# Patient Record
Sex: MALE | Race: WHITE
[De-identification: names, ages, dates, MRNs, and addresses within clinical notes are randomized per-mention and may not be internally consistent; named-entity substitution may affect disease eponyms.]

---

## 2020-01-25 NOTE — EDM.PDOC
ED HPI GENERAL MEDICAL PROBLEM





- General


Stated Complaint: NUMBNESS ON LT SIDE OF FACE AND HAND


Time Seen by Provider: 01/25/20 15:26


Source of Information: Reports: Patient


History Limitations: Reports: No Limitations





- History of Present Illness


INITIAL COMMENTS - FREE TEXT/NARRATIVE: 





79-year-old male who reports an approximate 6 PM last night he noticed some 

mild numbness along the left side of his face/cheek and his left hand. He 

reports that the numbness has been present at least to some degree since 6 PM 

last night. It does appear to be waxing and waning and at times she feels that 

it almost completely resolved but does not completely resolve. He has had no 

weakness. No balance issues. No vision problems. No headache. No problems 

speaking. No problems thinking. No problems swallowing. He reports that he has 

been eating and drinking normally. No trauma. He reports that he has been 

taking his medicines as directed daily. He denies any pain at present. He rates 

his pain as a 0/10. There are no other associated signs or symptoms. There are 

no other modifying factors. He does report that he was diagnosed with a TIA 1-1/

2 years ago at [facility] and he reports that he had rarely Which 

included scans of his head (MRI and CTA) and this showed really nothing except 

a small area in his left posterior brain that he did not feel was the cause of 

any of his problems or needed any acute intervention.


Onset: Other (6 PM last night)


Duration: Waxing/Waning (But continually present to some degree since 6 PM last 

night)


Location: Reports: Face, Upper Extremity, Left (Left hand)


Quality: Reports: Other (No pain. Just numbness.)


Severity: Mild


Improves with: Reports: None


Worsens with: Reports: None


Context: Reports: Other (As above)


Associated Symptoms: Reports: No Other Symptoms


Treatments PTA: Reports: Other (see below) (Nothing. He has taken his regular 

medications.)





- Related Data


 Allergies











Allergy/AdvReac Type Severity Reaction Status Date / Time


 


Sulfa (Sulfonamide Allergy  Cannot Verified 01/25/20 15:41





Antibiotics)   Remember  











Home Meds: 


 Home Meds





Lisinopril [Zestril] 10 mg PO DAILY 01/25/20 [History]


Simvastatin 40 mg PO DAILY 01/25/20 [History]


glyBURIDE [Glyburide] 5 mg PO DAILY 01/25/20 [History]


metFORMIN [Glucophage] 500 mg PO BID 01/25/20 [History]











Past Medical History


Cardiovascular History: Reports: High Cholesterol, Hypertension


Neurological History: Reports: TIA


Endocrine/Metabolic History: Reports: Diabetes, Type II





- Past Surgical History


GI Surgical History: Reports: Appendectomy


Dermatological Surgical History: Reports: Other (See Below) (Pilonidal 

cystectomy)





Social & Family History





- Tobacco Use


Smoking Status *Q: Former Smoker (With 35 years ago.)





- Alcohol Use


Alcohol Use History: Yes


Alcohol Use Frequency: Daily (Drinks 1 to 2 beers daily)





- Living Situation & Occupation


Occupation: Retired


Social History Comment: He presents to the emergency department by himself.





ED ROS GENERAL





- Review of Systems


Review Of Systems: See Below


Constitutional: Reports: No Symptoms


HEENT: Reports: No Symptoms


Respiratory: Reports: No Symptoms


Cardiovascular: Reports: No Symptoms


GI/Abdominal: Reports: No Symptoms


: Reports: No Symptoms


Musculoskeletal: Reports: No Symptoms


Skin: Reports: No Symptoms


Neurological: Reports: Numbness (Over left face and left hand)


Hematologic/Lymphatic: Reports: No Symptoms


Immunologic: Reports: No Symptoms





ED EXAM, NEURO





- Physical Exam


Exam: See Below


Exam Limited By: No Limitations


General Appearance: Alert, WD/WN, No Apparent Distress


Eye Exam: Bilateral Eye: EOMI, Normal Inspection, PERRL


Ears: Normal External Exam, Hearing Loss


Nose: Normal Inspection, Normal Mucosa, No Blood


Throat/Mouth: Normal Inspection, Normal Lips, Normal Oropharynx, Normal Voice, 

No Airway Compromise


Head Exam: Atraumatic, Normocephalic


Neck: Normal Inspection, Supple, Non-Tender, Full Range of Motion


Respiratory/Chest: No Respiratory Distress, Lungs Clear, Normal Breath Sounds, 

No Accessory Muscle Use, Chest Non-Tender


Cardiovascular: Normal Peripheral Pulses, Regular Rate, Rhythm, No JVD, No 

Murmur


GI/Abdominal: Normal Bowel Sounds, Soft, Non-Tender, No Organomegaly, No Mass


Neurological: Alert, Normal Mood/Affect, Normal Dorsiflexion, CN II-XII Intact, 

Normal Plantar Flexion, No Motor/Sensory Deficits, Oriented x 3


Back Exam: Normal Inspection, Full Range of Motion


Extremities: Normal Inspection, Normal Range of Motion, Non-Tender, No Pedal 

Edema, Normal Capillary Refill


Psychiatric: Normal Affect


Skin Exam: Warm, Dry, Intact, Normal Color, No Rash





EKG INTERPRETATION


EKG Date: 01/25/20


Time: 16:18


Rhythm: NSR


Rate (Beats/Min): 89


Axis: LAD-Left Axis Deviation (Mild)


P-Wave: Present


QRS: Normal


ST-T: Normal


QT: Prolonged (QTc)


DE/PQ Interval: Prolonged DE interval


Comparison: NA - No Prior EKG





Course





- Vital Signs


Last Recorded V/S: 


 Last Vital Signs











Temp  37.0 C   01/25/20 15:49


 


Pulse  92   01/25/20 15:49


 


Resp  15   01/25/20 15:49


 


BP  149/90 H  01/25/20 17:20


 


Pulse Ox  99   01/25/20 15:49














- Orders/Labs/Meds


Orders: 


 Active Orders 24 hr











 Category Date Time Status


 


 EKG Documentation Completion [RC] ASDIRECTED Care  01/25/20 15:46 Active


 


 Ang Head [CT] Stat Exams  01/25/20 17:23 Taken


 


 Ang Neck [CT] Stat Exams  01/25/20 17:23 Taken


 


 Head wo Cont [CT] Stat Exams  01/25/20 15:45 Taken


 


 Sodium Chloride 0.9% [Saline Flush] Med  01/25/20 15:45 Active





 10 ml FLUSH ASDIRECTED PRN   


 


 Peripheral IV Insertion Adult [OM.PC] Routine Oth  01/25/20 15:45 Ordered


 


 EKG 12 Lead [EK] Routine Ther  01/25/20 15:45 Ordered








 Medication Orders





Sodium Chloride (Saline Flush)  10 ml FLUSH ASDIRECTED PRN


   PRN Reason: Keep Vein Open








Labs: 


 Laboratory Tests











  01/25/20 01/25/20 Range/Units





  16:08 16:08 


 


WBC  9.3   (4.5-12.0)  X10-3/uL


 


RBC  5.16   (4.30-5.75)  x10(6)uL


 


Hgb  14.9   (13.5-17.8)  g/dL


 


Hct  44.7   (30.0-51.3)  %


 


MCV  86.5   (80-96)  fL


 


MCH  28.9   (27.7-33.6)  pg


 


MCHC  33.4   (32.2-35.4)  g/dL


 


RDW  13.0   (11.5-15.5)  %


 


Plt Count  285   (125-369)  X10(3)uL


 


MPV  7.9   (7.4-10.4)  fL


 


Neut % (Auto)  61.9   (46-82)  %


 


Lymph % (Auto)  27.3   (13-37)  %


 


Mono % (Auto)  6.9   (4-12)  %


 


Eos % (Auto)  3   (1.0-5.0)  %


 


Baso % (Auto)  1   (0-2)  %


 


Neut # (Auto)  5.8   (1.6-8.3)  #


 


Lymph # (Auto)  2.5   (0.6-5.0)  #


 


Mono # (Auto)  0.6   (0.0-1.3)  #


 


Eos # (Auto)  0.3   (0.0-0.8)  #


 


Baso # (Auto)  0.1   (0.0-0.2)  #


 


ESR  6   (0-15)  mm/hr


 


Sodium   139  (135-145)  mmol/L


 


Potassium   4.2  (3.5-5.3)  mmol/L


 


Chloride   102  (100-110)  mmol/L


 


Carbon Dioxide   26  (21-32)  mmol/L


 


BUN   18  (7-18)  mg/dL


 


Creatinine   1.0  (0.70-1.30)  mg/dL


 


Est Cr Clr Drug Dosing   TNP  


 


Estimated GFR (MDRD)   > 60  (>60)  


 


BUN/Creatinine Ratio   18.0  (9-20)  


 


Glucose   148 H  ()  mg/dL


 


Calcium   9.5  (8.6-10.2)  mg/dL


 


Magnesium   1.9  (1.8-2.5)  mg/dL


 


Total Bilirubin   0.3  (0.1-1.3)  mg/dL


 


AST   15  (5-25)  IU/L


 


ALT   21  (12-36)  U/L


 


Alkaline Phosphatase   93  ()  IU/L


 


Total Protein   7.5  (6.0-8.0)  g/dL


 


Albumin   3.8  (3.2-4.6)  g/dL


 


Globulin   3.7  g/dL


 


Albumin/Globulin Ratio   1.0  











Meds: 


Medications











Generic Name Dose Route Start Last Admin





  Trade Name Freq  PRN Reason Stop Dose Admin


 


Sodium Chloride  10 ml  01/25/20 15:45  





  Saline Flush  FLUSH   





  ASDIRECTED PRN   





  Keep Vein Open   





     





     





     














Discontinued Medications














Generic Name Dose Route Start Last Admin





  Trade Name Freq  PRN Reason Stop Dose Admin


 


Atorvastatin Calcium  40 mg  01/25/20 17:29  01/25/20 18:21





  Lipitor  PO  01/25/20 17:30  40 mg





  ONETIME ONE   Administration





     





     





     





     


 


Clopidogrel Bisulfate  300 mg  01/25/20 17:25  01/25/20 17:49





  Plavix  PO  01/25/20 17:26  300 mg





  ONETIME ONE   Administration





     





     





     





     


 


Sodium Chloride  500 mls @ 999 mls/hr  01/25/20 17:23  01/25/20 17:59





  Normal Saline  IV  01/25/20 17:53  999 mls/hr





  .BOLUS ONE   Administration





     





     





     





     


 


Iopamidol  100 ml  01/25/20 17:34  01/25/20 17:45





  Isovue-370 (76%)  IV  01/25/20 17:35  100 ml





  .AS DIRECTED ONE   Administration





     





     





     





     














- Radiology Interpretation


Free Text/Narrative:: 





CT scan of head showed no acute abnormality per the [facility] radiologist in 

[place].





CTA of the head shows no high-grade stenosis or occlusion per the radiologist.





CTA of the neck shows occlusion of the left vertebral artery about 2 m distal 

to the bifurcation with reconstitution and multifocal on the less than 50%, 

narrowing proximal to the mid left vertebral artery the radiologist.








- Re-Assessments/Exams


Free Text/Narrative Re-Assessment/Exam: 





01/25/20 17:08: Patient has remained neurologically stable. His blood pressure 

is still in the 180/100 range. He has no headache. He still has some mild 

numbness along his left face and left hand. His CT is normal. His blood tests 

are reassuringly normal. The EKG showed no acute problems. I will will call and 

discuss his case with the stroke neurologist at [facility] in [place].





01/25/20 17:25: I discussed the patient's case with Dr. Vasquez, stroke 

neurologist at [facility] in [place], and he feels that patient should be admitted. 

He feels the patient would be able to be admitted at our facility. He did 

recommend getting a CTA of the patient's head and neck and he would only 

recommend transfer to a higher level of care if the patient had significant 

stenosis in his carotids or the patient had some deterioration. He did not 

recommend treating the patient's blood pressure the blood pressure mynor greater 

than 220/120. He did recommend change in the patient the Plavix and he would 

give the patient a 300 mg loading dose initially. He also recommended the 

patient to Lipitor 40 mg daily the patient did not have any contraindication to 

this. I discussed this with the patient and he is in agreement with this plan. 

His repeat blood pressure was 140/90.





01/25/20 19:50: CTA of the head and neck did show some occlusion in the left 

vertebral artery cussed the case with Dr. Vasquez, stroke neurologist at 

[facility], and he felt that there was no acute intervention that needed 

to be made of this and the patient could still be admitted at our facility with 

no need for transfer at this time. I discussed this with the patient and he is 

in agreement with the plans for admission here at [facility].








Departure





- Departure


Time of Disposition: 19:55


Disposition: Refer to Observation


Condition: Good (Stable)


Clinical Impression: 


 Acute CVA (cerebrovascular accident)





Hypertension


Qualifiers:


 Hypertension type: essential hypertension Qualified Code(s): I10 - Essential (

primary) hypertension








- Discharge Information


Referrals: 


Kam Faria MD [Primary Care Provider] - 





Sepsis Event Note





- Focused Exam


Vital Signs: 


 Vital Signs











  Temp Pulse Resp BP Pulse Ox


 


 01/25/20 17:20     149/90 H 


 


 01/25/20 15:49  37.0 C  92  15  182/103 H  99











Date Exam was Performed: 01/25/20


Time Exam was Performed: 19:56





- My Orders


Last 24 Hours: 


My Active Orders





01/25/20 15:45


Head wo Cont [CT] Stat 


Sodium Chloride 0.9% [Saline Flush]   10 ml FLUSH ASDIRECTED PRN 


Peripheral IV Insertion Adult [OM.PC] Routine 


EKG 12 Lead [EK] Routine 





01/25/20 15:46


EKG Documentation Completion [RC] ASDIRECTED 





01/25/20 17:23


Ang Head [CT] Stat 


Ang Neck [CT] Stat 














- Assessment/Plan


Last 24 Hours: 


My Active Orders





01/25/20 15:45


Head wo Cont [CT] Stat 


Sodium Chloride 0.9% [Saline Flush]   10 ml FLUSH ASDIRECTED PRN 


Peripheral IV Insertion Adult [OM.PC] Routine 


EKG 12 Lead [EK] Routine 





01/25/20 15:46


EKG Documentation Completion [RC] ASDIRECTED 





01/25/20 17:23


Ang Head [CT] Stat 


Ang Neck [CT] Stat

## 2020-01-27 NOTE — DISCH
DISCHARGE DATE:  01/26/2020

 

HISTORY:  Dennis is a 79-year-old man who had a TIA diagnosed approximately a

year and a half ago.  At that time, he described slight dysarthria, slight

clumsiness of his left arm and left leg.  Evaluation through Neurology up in

[place] included CT angiogram of the cerebral vessels showing a partial

obstruction in the left vertebral artery and was otherwise described as

unremarkable.

 

The patient states that, over the past day and a half, he has had some tingling

in the left arm and slightly around the mouth.  He did not notice any associated

weakness and it waxed and waned slightly until he came into the emergency room

last evening.  CT of the head was negative, and he was admitted for observation.

He says his tingling is improved, but not 100% gone.  He has had no additional

neurologic symptoms develop.

 

Laboratory on admission showed hemoglobin of 14.9.  Electrolytes normal;

creatinine 1.0, glucose 148.

 

The patient was loaded with 300 mg of Plavix and started on 75 mg daily.  His

statin, glyburide, lisinopril, and metformin were continued.

 

By the next morning, he had no additional neurologic symptoms.  He was up

walking without difficulty.  Blood pressure was 148/76, and he was deemed ready

for discharge.

 

He is discharged to home in stable condition to continue medications as follows:

 

1. Plavix 75 mg daily.

2. Simvastatin 40 mg daily.

3. Metformin 500 mg b.i.d.

4. Lisinopril 10 mg daily.

5. Glipizide XL 10 mg 1 daily.

6. Tylenol p.r.n.

 

He is asked to have followup with Dr. Faria within 2 weeks and call should

there be questions or problems prior to that time.

 

 

 

 

 

 

 

Job#: [id number]

DD: 01/26/2020 1008

DT: 01/26/2020 1059 KOMAL/JAN

## 2020-06-09 ENCOUNTER — HOSPITAL ENCOUNTER (OUTPATIENT)
Dept: HOSPITAL 7 - FB.SDS | Age: 80
Discharge: HOME | End: 2020-06-09
Attending: OPHTHALMOLOGY
Payer: MEDICARE

## 2020-06-09 DIAGNOSIS — H52.203: ICD-10-CM

## 2020-06-09 DIAGNOSIS — E78.5: ICD-10-CM

## 2020-06-09 DIAGNOSIS — Z79.84: ICD-10-CM

## 2020-06-09 DIAGNOSIS — H35.039: ICD-10-CM

## 2020-06-09 DIAGNOSIS — E11.36: Primary | ICD-10-CM

## 2020-06-09 DIAGNOSIS — E66.9: ICD-10-CM

## 2020-06-09 DIAGNOSIS — E11.39: ICD-10-CM

## 2020-06-09 DIAGNOSIS — Z87.891: ICD-10-CM

## 2020-06-09 DIAGNOSIS — H25.813: ICD-10-CM

## 2020-06-09 DIAGNOSIS — H42: ICD-10-CM

## 2020-06-09 DIAGNOSIS — Z79.899: ICD-10-CM

## 2020-06-09 DIAGNOSIS — I10: ICD-10-CM

## 2020-06-09 DIAGNOSIS — E11.3293: ICD-10-CM

## 2020-06-09 DIAGNOSIS — H40.003: ICD-10-CM

## 2020-06-09 PROCEDURE — V2632 POST CHMBR INTRAOCULAR LENS: HCPCS

## 2020-06-09 PROCEDURE — 66984 XCAPSL CTRC RMVL W/O ECP: CPT

## 2020-06-10 NOTE — OR
DATE OF OPERATION:  06/09/2020

 

SURGEON:  Betzaida Sams MD

 

PREOPERATIVE DIAGNOSIS:  Visually significant cataract, left eye.

 

POSTOPERATIVE DIAGNOSIS:  Visually significant cataract, left eye.

 

PROCEDURES PERFORMED:  Phacoemulsification with intraocular lens placement, left

eye.

 

ASSISTANTS:  None.

 

ANESTHESIA:  Local with sedation.

 

COMPLICATIONS:  None.

 

BLOOD LOSS:  None.

 

IMPLANTS:  Ricardo ACU0T0 21.0 diopter lens implanted.

 

CDE:  4.51.

 

DESCRIPTION OF PROCEDURE:  After risks and benefits were reviewed with the

patient, consent was obtained in the preoperative area, and the operative eye

was marked with a surgical pen.  In the preoperative area, a pledget was used to

dilate the pupil consisting of a mixture of phenylephrine 10%, cyclopentolate

2%, moxifloxacin 0.5%, and bupivacaine 0.75%.  The patient was taken to the

operating room, where a time-out was performed, and the patient was placed under

monitored anesthesia care.  Topical tetracaine was used for anesthesia.

 

The operative eye was prepped and draped for ophthalmic surgery, and the

microscope was brought into position and focused.  A paracentesis incision was

made, followed by injection of preservative-free 1% lidocaine into the anterior

chamber, followed by injection of Viscoat into the anterior chamber.  A

microkeratome blade was used to make a corneal limbal incision temporally.  A

cystotome was used to make the beginning of the capsulorrhexis, which was

carried around 360 degrees in a curvilinear fashion using Utrata forceps.  A

Hunter cannula with BSS was used to hydrodissect and hydrodelineate the nucleus.

The nucleus was removed in a divide and conquer manner using

phacoemulsification.  Irrigation and aspiration were used to remove the

remaining cortical material.  Provisc was used to inflate the capsular bag, and

a pre-loaded Ricardo ACU0T0 21.0 diopter lens, serial number [id number] was

injected into the capsular bag.  A Sinskey hook was used to position and center

the lens.

 

Next, irrigation and aspiration was used to remove any remaining viscoelastic

and cortical material from the anterior chamber.  BSS on a cannula was used to

inflate the anterior chamber and hydrate the wound.  The wound was checked and

found to be watertight.  1 mg of Moxifloxacin was injected into the anterior

chamber.  Drapes were removed and the eye was cleaned.  A drop of brimonidine

0.15% and a drop of TobraDex was placed.  The eye was shielded, and the patient

was taken to the recovery room in stable condition.

 

CC:  NIRMALA OH OD

 

Job#: [id number]

DD: 06/09/2020 0853

DT: 06/09/2020 1017 AK/JAN

## 2020-06-23 ENCOUNTER — HOSPITAL ENCOUNTER (OUTPATIENT)
Dept: HOSPITAL 7 - FB.SDS | Age: 80
Discharge: HOME | End: 2020-06-23
Attending: OPHTHALMOLOGY
Payer: MEDICARE

## 2020-06-23 DIAGNOSIS — I10: ICD-10-CM

## 2020-06-23 DIAGNOSIS — H25.813: ICD-10-CM

## 2020-06-23 DIAGNOSIS — E66.9: ICD-10-CM

## 2020-06-23 DIAGNOSIS — H52.203: ICD-10-CM

## 2020-06-23 DIAGNOSIS — E11.36: Primary | ICD-10-CM

## 2020-06-23 DIAGNOSIS — E11.3293: ICD-10-CM

## 2020-06-23 DIAGNOSIS — H35.039: ICD-10-CM

## 2020-06-23 DIAGNOSIS — Z79.84: ICD-10-CM

## 2020-06-23 DIAGNOSIS — Z79.899: ICD-10-CM

## 2020-06-23 DIAGNOSIS — H40.003: ICD-10-CM

## 2020-06-23 DIAGNOSIS — Z87.891: ICD-10-CM

## 2020-06-23 PROCEDURE — 00142 ANES PX ON EYE LENS SURGERY: CPT

## 2020-06-23 PROCEDURE — 66984 XCAPSL CTRC RMVL W/O ECP: CPT

## 2020-06-23 PROCEDURE — 82962 GLUCOSE BLOOD TEST: CPT

## 2020-06-23 PROCEDURE — V2632 POST CHMBR INTRAOCULAR LENS: HCPCS

## 2020-06-23 NOTE — OR
DATE OF OPERATION:  06/23/2020

 

SURGEON:  Betzaida Sams MD

 

PREOPERATIVE DIAGNOSIS:  Visually significant cataract, right eye.

 

POSTOPERATIVE DIAGNOSIS:  Visually significant cataract, right eye.

 

PROCEDURES PERFORMED:  Phacoemulsification with intraocular lens placement,

right eye.

 

ASSISTANTS:  None.

 

ANESTHESIA:  Local with sedation.

 

COMPLICATIONS:  None.

 

BLOOD LOSS:  None.

 

IMPLANTS:  Ricardo ACU0T0 20.5 diopter lens implanted.

 

CDE:  5.61.

 

DESCRIPTION OF PROCEDURE:  After risks and benefits were reviewed with the

patient, consent was obtained in the preoperative area, and the operative eye

was marked with a surgical pen.  In the preoperative area, a pledget was used to

dilate the pupil consisting of a mixture of phenylephrine 10%, cyclopentolate

2%, moxifloxacin 0.5%, and bupivacaine 0.75%.  The patient was taken to the

operating room, where a time-out was performed, and the patient was placed under

monitored anesthesia care.  Topical tetracaine was used for anesthesia.

 

The operative eye was prepped and draped for ophthalmic surgery, and the

microscope was brought into position and focused.  A paracentesis incision was

made, followed by injection of preservative-free 1% lidocaine into the anterior

chamber, followed by injection of Viscoat into the anterior chamber.  A

microkeratome blade was used to make a corneal limbal incision temporally.  A

cystotome was used to make the beginning of the capsulorrhexis, which was

carried around 360 degrees in a curvilinear fashion using Utrata forceps.  A

Hunter cannula with BSS was used to hydrodissect and hydrodelineate the nucleus.

The nucleus was removed in a divide and conquer manner using

phacoemulsification.  Irrigation and aspiration were used to remove the

remaining cortical material.  Provisc was used to inflate the capsular bag, and

a pre-loaded Ricardo ACU0T0 20.5 diopter lens, serial #[id number] was injected

into the capsular bag.  A Sinskey hook was used to position and center the lens.

 

Next, irrigation and aspiration was used to remove any remaining viscoelastic

and cortical material from the anterior chamber.  BSS on a cannula was used to

inflate the anterior chamber and hydrate the wound.  The wound was checked and

found to be watertight.  1 mg of Moxifloxacin was injected into the anterior

chamber.  Drapes were removed and the eye was cleaned.  A drop of brimonidine

0.15% and a drop of TobraDex was placed.  The eye was shielded, and the patient

was taken to the recovery room in stable condition.

 

CC:  WU MOULTON CNP

 

Job#: [id number]

DD: 06/23/2020 0824

DT: 06/23/2020 1047 AK/JAN